# Patient Record
(demographics unavailable — no encounter records)

---

## 2024-10-17 NOTE — ASSESSMENT
[FreeTextEntry1] : Impression: This 81-year-old male patient has history of diabetes hypertension atrial fibrillation hypothyroidism and a presentation consistent with parkinsonism.  Suspect Parkinson's disease based on the workup and a positive response to levodopa.  Recommendations: Continue carbidopa levodopa 25/100 mg 1.5 tablets 3 times daily.  Begin amantadine 100 mg 1 capsule in the a.m. for 2 weeks and then 1 capsule twice daily as directed.  Office follow-up in 3 months.

## 2024-10-17 NOTE — PHYSICAL EXAM
[FreeTextEntry1] : Head:  Normocephalic Neck: Supple.  Mental Status:  Alert approximately oriented mildly hypophonic speech.  Cranial Nerves:  PERRL, Visual Fields full  EOMI no diplopia no ptosis no nystagmus, V through XII intact.  No significant facial masking.  Motor: On the minimal upper extremity bilateral cogwheeling.  Good strength.  No tremor.  Moves right greater than left lower extremities.  DTRs: Hypoactive neutral plantars.  Sensory: Unchanged.  Gait: He can stand from the wheelchair he has difficulty initiating movement with short stepped festinating gait and he will freeze.  There is postural instability.  Today he is not able to ambulate independently.  He does use a walker at home.

## 2024-10-17 NOTE — HISTORY OF PRESENT ILLNESS
[FreeTextEntry1] : This patient is seen for an office visit with his son with a diagnosis of Parkinson's disease.  He is no longer receiving physical therapy.  He continues to take carbidopa levodopa 25/100 mg 1.5 tablets 3 times daily.  He is not having any side effects.  His ambulatory status has deteriorated somewhat.  He can stand from the wheelchair but his gait is extremely short stepped festinating and he tends to freeze.  There is postural instability.  He can ambulate with assistant he does use a walker at home.  Cognitive status is stable.  He is not having any described issues with hand coordination.  F dopa PET/CT on 8/7/2023 was abnormal consistent with parkinsonism.  FDG brain PET scan on 9/23/2023 was abnormal suggesting a pattern most consistent with idiopathic Parkinson's disease.

## 2025-02-12 NOTE — HISTORY OF PRESENT ILLNESS
[FreeTextEntry1] : This patient is seen for an office visit with his son.  He was last seen on 10/17/2024.  He is an 82-year-old male with a diagnosis of parkinsonism improved with carbidopa levodopa 25/100 mg 1.5 tablets 3 times daily and the addition of amantadine 100 mg twice daily has shown improvement.  He denies side effects from these medications.  He can stand from the wheelchair and ambulate with a walker unassisted.  He denies any falls.  He does tend to freeze and he has good and bad days.  He is not having any obvious side effects from the medication.  Cognitive status speech swallowing and coordination with fine hand movements stable.  Records indicate that he was for started on carbo levodopa on 4/4/2023 and the medication was increased to 1.5 tablets 3 times daily on 8/22/2023.  He had an abnormal F dopa PET/CT on 8/7/2023 and an FDG brain PET scan consistent with idiopathic Parkinson's disease on 9/23/2023.

## 2025-02-12 NOTE — PHYSICAL EXAM
[FreeTextEntry1] : Head:  Normocephalic Neck: Supple.  Mental Status:  Alert approximately oriented mildly hypophonic speech.  Cranial Nerves:  PERRL, Visual Fields full  EOMI no diplopia no ptosis no nystagmus, V through XII intact.  No significant facial masking.  Motor: On the minimal upper extremity bilateral cogwheeling.  Good strength.  No tremor.  Moves right greater than left lower extremities.  DTRs: Hypoactive neutral plantars.  Sensory: Unchanged.  Gait: He can stand from the wheelchair unassisted.  There is definitely freezing of gait with difficulty initiating movement and his gait is short stride festinating with postural instability.

## 2025-02-12 NOTE — ASSESSMENT
[FreeTextEntry1] : Impression: This 82-year-old male patient with diabetes hypertension atrial fibrillation hypothyroidism has presentation consistent with Parkinson's disease.  He has a prominent gait disorder with a tendency to freeze.  Recommendations: Increase carbidopa levodopa 25/100 mg to 2 tablets 3 times daily.  Continue amantadine 100 mg 1 capsule twice daily.  Office follow-up in 6 months.  Discussed with his son was present at the time of the visit.

## 2025-03-14 NOTE — PROCEDURE
[de-identified] : Indication:  Unable to adequately examine nasal passages and sinus drainage with anterior rhinoscopy. The patient has displaced nasal fx  Scope # 44 Mild septal deviation is present on direct visualization on either side. Both inferior nasal turbinates are moderate in size with normal  appearing mucosa.  The sinus endoscope was introduced into the right nares exam right middle meatus reveals no mucopus, polyps or inflammation.  The middle turbinate is unremarkable. The scope was advanced and the sphenoethmoid region was inspected. The superior meatus and nasal vault are unremarkable.  The nasopharynx is unremarkable without inflammation or mass The sinus endoscope was introduced into the left nares exam of the left middle meatus reveals no mucopus, polyps or inflammation and the left middle turbinate is unremarkable. The scope was advanced and the sphenoethmoid region was inspected. The left superior meatus and nasal vault are unremarkable.

## 2025-03-14 NOTE — PHYSICAL EXAM
[Nasal Endoscopy Performed] : nasal endoscopy was performed, see procedure section for findings [] : septum deviated to the right [de-identified] : depressed left nasal bone and lateral shift rt bone, contusion over bony dorsum [Midline] : trachea located in midline position [Normal] : no rashes

## 2025-03-14 NOTE — HISTORY OF PRESENT ILLNESS
[de-identified] : acidental fall 2 d ago to hospital initial epistaxis, now w am congestion Eliquis

## 2025-03-14 NOTE — REVIEW OF SYSTEMS
[Patient Intake Form Reviewed] : Patient intake form was reviewed [Negative] : Nasal [de-identified] : lost balance at home and fell, incident happened about 2 days ago. Pt went to [facility]. Pt states hard to breath,

## 2025-03-14 NOTE — ASSESSMENT
[FreeTextEntry1] : reviewed ct  ER displaced nasal fx healing contusion dorsum rec office closed reduction pt to consider

## 2025-03-14 NOTE — REASON FOR VISIT
[Initial Consultation] : an initial consultation for [Family Member] : family member [FreeTextEntry2] : nasal fracture

## 2025-07-28 NOTE — HISTORY OF PRESENT ILLNESS
[FreeTextEntry1] : New patient appointment for CV risks, AFlutter.  81 yo man  HTN HLD DM Hypothyroid Atrial fib / flutter, s/p ablation 2023 LV dysfunction: SANDY: 2023: Severe global LV dysfunction, moderate MR; mild AR; decreased RV function; mild-moderate TR Parkinson's Disease DVT

## 2025-07-28 NOTE — HISTORY OF PRESENT ILLNESS
[FreeTextEntry1] : New patient appointment for CV risks, AFlutter.  83 yo man  HTN HLD DM Hypothyroid Atrial fib / flutter, s/p ablation 2023 LV dysfunction: SANDY: 2023: Severe global LV dysfunction, moderate MR; mild AR; decreased RV function; mild-moderate TR Parkinson's Disease DVT